# Patient Record
Sex: FEMALE | Race: WHITE | NOT HISPANIC OR LATINO | Employment: UNEMPLOYED | URBAN - METROPOLITAN AREA
[De-identification: names, ages, dates, MRNs, and addresses within clinical notes are randomized per-mention and may not be internally consistent; named-entity substitution may affect disease eponyms.]

---

## 2018-06-24 ENCOUNTER — APPOINTMENT (EMERGENCY)
Dept: RADIOLOGY | Facility: HOSPITAL | Age: 28
End: 2018-06-24
Payer: MEDICAID

## 2018-06-24 ENCOUNTER — APPOINTMENT (EMERGENCY)
Dept: CT IMAGING | Facility: HOSPITAL | Age: 28
End: 2018-06-24
Payer: MEDICAID

## 2018-06-24 ENCOUNTER — APPOINTMENT (EMERGENCY)
Dept: ULTRASOUND IMAGING | Facility: HOSPITAL | Age: 28
End: 2018-06-24
Payer: MEDICAID

## 2018-06-24 ENCOUNTER — HOSPITAL ENCOUNTER (EMERGENCY)
Facility: HOSPITAL | Age: 28
Discharge: LEFT BEFORE DISCHARGE | End: 2018-06-24
Attending: EMERGENCY MEDICINE
Payer: MEDICAID

## 2018-06-24 VITALS
SYSTOLIC BLOOD PRESSURE: 107 MMHG | WEIGHT: 162.92 LBS | HEART RATE: 94 BPM | OXYGEN SATURATION: 94 % | RESPIRATION RATE: 20 BRPM | DIASTOLIC BLOOD PRESSURE: 53 MMHG | TEMPERATURE: 98.4 F

## 2018-06-24 DIAGNOSIS — R65.20 SEVERE SEPSIS (HCC): ICD-10-CM

## 2018-06-24 DIAGNOSIS — J18.9 PNEUMONIA: Primary | ICD-10-CM

## 2018-06-24 DIAGNOSIS — R17 JAUNDICE: ICD-10-CM

## 2018-06-24 DIAGNOSIS — E80.6 DIRECT HYPERBILIRUBINEMIA: ICD-10-CM

## 2018-06-24 DIAGNOSIS — A41.9 SEVERE SEPSIS (HCC): ICD-10-CM

## 2018-06-24 DIAGNOSIS — R10.9 ABDOMINAL PAIN: ICD-10-CM

## 2018-06-24 LAB
ALBUMIN SERPL BCP-MCNC: 2.4 G/DL (ref 3.5–5)
ALP SERPL-CCNC: 86 U/L (ref 46–116)
ALT SERPL W P-5'-P-CCNC: 16 U/L (ref 12–78)
ANION GAP SERPL CALCULATED.3IONS-SCNC: 10 MMOL/L (ref 4–13)
APTT PPP: 55 SECONDS (ref 24–36)
AST SERPL W P-5'-P-CCNC: 86 U/L (ref 5–45)
ATRIAL RATE: 95 BPM
B-HCG SERPL-ACNC: <2 MIU/ML
BASOPHILS # BLD AUTO: 0.04 THOUSANDS/ΜL (ref 0–0.1)
BASOPHILS NFR BLD AUTO: 0 % (ref 0–1)
BILIRUB DIRECT SERPL-MCNC: 15.37 MG/DL (ref 0–0.2)
BILIRUB SERPL-MCNC: 20.4 MG/DL (ref 0.2–1)
BUN SERPL-MCNC: 5 MG/DL (ref 5–25)
CALCIUM SERPL-MCNC: 8 MG/DL (ref 8.3–10.1)
CHLORIDE SERPL-SCNC: 97 MMOL/L (ref 100–108)
CO2 SERPL-SCNC: 26 MMOL/L (ref 21–32)
CREAT SERPL-MCNC: 0.58 MG/DL (ref 0.6–1.3)
EOSINOPHIL # BLD AUTO: 0.19 THOUSAND/ΜL (ref 0–0.61)
EOSINOPHIL NFR BLD AUTO: 1 % (ref 0–6)
ERYTHROCYTE [DISTWIDTH] IN BLOOD BY AUTOMATED COUNT: 16.8 % (ref 11.6–15.1)
GFR SERPL CREATININE-BSD FRML MDRD: 126 ML/MIN/1.73SQ M
GGT SERPL-CCNC: 164 U/L (ref 5–85)
GLUCOSE SERPL-MCNC: 92 MG/DL (ref 65–140)
HAV IGM SER QL: ABNORMAL
HBV CORE IGM SER QL: ABNORMAL
HBV SURFACE AG SER QL: ABNORMAL
HCT VFR BLD AUTO: 28.9 % (ref 34.8–46.1)
HCV AB SER QL: ABNORMAL
HGB BLD-MCNC: 9.8 G/DL (ref 11.5–15.4)
INR PPP: 2.1 (ref 0.86–1.17)
LACTATE SERPL-SCNC: 1.4 MMOL/L (ref 0.5–2)
LIPASE SERPL-CCNC: 74 U/L (ref 73–393)
LYMPHOCYTES # BLD AUTO: 1.26 THOUSANDS/ΜL (ref 0.6–4.47)
LYMPHOCYTES NFR BLD AUTO: 7 % (ref 14–44)
MCH RBC QN AUTO: 34.5 PG (ref 26.8–34.3)
MCHC RBC AUTO-ENTMCNC: 33.9 G/DL (ref 31.4–37.4)
MCV RBC AUTO: 102 FL (ref 82–98)
MONOCYTES # BLD AUTO: 1.87 THOUSAND/ΜL (ref 0.17–1.22)
MONOCYTES NFR BLD AUTO: 10 % (ref 4–12)
NEUTROPHILS # BLD AUTO: 16.01 THOUSANDS/ΜL (ref 1.85–7.62)
NEUTS SEG NFR BLD AUTO: 83 % (ref 43–75)
NT-PROBNP SERPL-MCNC: 1202 PG/ML
P AXIS: 44 DEGREES
PLATELET # BLD AUTO: 318 THOUSANDS/UL (ref 149–390)
PMV BLD AUTO: 10.1 FL (ref 8.9–12.7)
POTASSIUM SERPL-SCNC: 3.6 MMOL/L (ref 3.5–5.3)
PR INTERVAL: 152 MS
PROT SERPL-MCNC: 6.6 G/DL (ref 6.4–8.2)
PROTHROMBIN TIME: 22.9 SECONDS (ref 11.8–14.2)
QRS AXIS: 56 DEGREES
QRSD INTERVAL: 82 MS
QT INTERVAL: 320 MS
QTC INTERVAL: 402 MS
RBC # BLD AUTO: 2.84 MILLION/UL (ref 3.81–5.12)
SODIUM SERPL-SCNC: 133 MMOL/L (ref 136–145)
T WAVE AXIS: 6 DEGREES
VENTRICULAR RATE: 95 BPM
WBC # BLD AUTO: 19.37 THOUSAND/UL (ref 4.31–10.16)

## 2018-06-24 PROCEDURE — 83880 ASSAY OF NATRIURETIC PEPTIDE: CPT | Performed by: EMERGENCY MEDICINE

## 2018-06-24 PROCEDURE — 71046 X-RAY EXAM CHEST 2 VIEWS: CPT

## 2018-06-24 PROCEDURE — 84702 CHORIONIC GONADOTROPIN TEST: CPT | Performed by: EMERGENCY MEDICINE

## 2018-06-24 PROCEDURE — 82977 ASSAY OF GGT: CPT | Performed by: EMERGENCY MEDICINE

## 2018-06-24 PROCEDURE — 96375 TX/PRO/DX INJ NEW DRUG ADDON: CPT

## 2018-06-24 PROCEDURE — 83605 ASSAY OF LACTIC ACID: CPT | Performed by: EMERGENCY MEDICINE

## 2018-06-24 PROCEDURE — 93010 ELECTROCARDIOGRAM REPORT: CPT | Performed by: INTERNAL MEDICINE

## 2018-06-24 PROCEDURE — 85610 PROTHROMBIN TIME: CPT | Performed by: EMERGENCY MEDICINE

## 2018-06-24 PROCEDURE — 36415 COLL VENOUS BLD VENIPUNCTURE: CPT | Performed by: EMERGENCY MEDICINE

## 2018-06-24 PROCEDURE — 82248 BILIRUBIN DIRECT: CPT | Performed by: EMERGENCY MEDICINE

## 2018-06-24 PROCEDURE — 83690 ASSAY OF LIPASE: CPT | Performed by: EMERGENCY MEDICINE

## 2018-06-24 PROCEDURE — 99285 EMERGENCY DEPT VISIT HI MDM: CPT

## 2018-06-24 PROCEDURE — 80074 ACUTE HEPATITIS PANEL: CPT | Performed by: EMERGENCY MEDICINE

## 2018-06-24 PROCEDURE — 85730 THROMBOPLASTIN TIME PARTIAL: CPT | Performed by: EMERGENCY MEDICINE

## 2018-06-24 PROCEDURE — 74177 CT ABD & PELVIS W/CONTRAST: CPT

## 2018-06-24 PROCEDURE — 85025 COMPLETE CBC W/AUTO DIFF WBC: CPT | Performed by: EMERGENCY MEDICINE

## 2018-06-24 PROCEDURE — 96367 TX/PROPH/DG ADDL SEQ IV INF: CPT

## 2018-06-24 PROCEDURE — 96361 HYDRATE IV INFUSION ADD-ON: CPT

## 2018-06-24 PROCEDURE — 93005 ELECTROCARDIOGRAM TRACING: CPT

## 2018-06-24 PROCEDURE — 87040 BLOOD CULTURE FOR BACTERIA: CPT | Performed by: EMERGENCY MEDICINE

## 2018-06-24 PROCEDURE — 76705 ECHO EXAM OF ABDOMEN: CPT

## 2018-06-24 PROCEDURE — 96365 THER/PROPH/DIAG IV INF INIT: CPT

## 2018-06-24 PROCEDURE — 80053 COMPREHEN METABOLIC PANEL: CPT | Performed by: EMERGENCY MEDICINE

## 2018-06-24 RX ORDER — CIPROFLOXACIN 500 MG/1
500 TABLET, FILM COATED ORAL EVERY 12 HOURS SCHEDULED
COMMUNITY

## 2018-06-24 RX ORDER — FUROSEMIDE 20 MG/1
20 TABLET ORAL DAILY PRN
COMMUNITY

## 2018-06-24 RX ORDER — SODIUM CHLORIDE 9 MG/ML
125 INJECTION, SOLUTION INTRAVENOUS CONTINUOUS
Status: DISCONTINUED | OUTPATIENT
Start: 2018-06-24 | End: 2018-06-24 | Stop reason: HOSPADM

## 2018-06-24 RX ORDER — ONDANSETRON 2 MG/ML
4 INJECTION INTRAMUSCULAR; INTRAVENOUS ONCE
Status: COMPLETED | OUTPATIENT
Start: 2018-06-24 | End: 2018-06-24

## 2018-06-24 RX ORDER — PANTOPRAZOLE SODIUM 40 MG/1
40 TABLET, DELAYED RELEASE ORAL DAILY
COMMUNITY

## 2018-06-24 RX ORDER — ALBUMIN, HUMAN INJ 5% 5 %
12.5 SOLUTION INTRAVENOUS ONCE
Status: COMPLETED | OUTPATIENT
Start: 2018-06-24 | End: 2018-06-24

## 2018-06-24 RX ORDER — ONDANSETRON 4 MG/1
4 TABLET, ORALLY DISINTEGRATING ORAL EVERY 6 HOURS PRN
COMMUNITY

## 2018-06-24 RX ORDER — VANCOMYCIN HYDROCHLORIDE 1 G/200ML
15 INJECTION, SOLUTION INTRAVENOUS ONCE
Status: DISCONTINUED | OUTPATIENT
Start: 2018-06-24 | End: 2018-06-24 | Stop reason: HOSPADM

## 2018-06-24 RX ADMIN — PIPERACILLIN SODIUM,TAZOBACTAM SODIUM 3.38 G: 3; .375 INJECTION, POWDER, FOR SOLUTION INTRAVENOUS at 08:11

## 2018-06-24 RX ADMIN — ALBUMIN HUMAN 12.5 G: 0.05 INJECTION, SOLUTION INTRAVENOUS at 07:57

## 2018-06-24 RX ADMIN — HYDROMORPHONE HYDROCHLORIDE 0.5 MG: 1 INJECTION, SOLUTION INTRAMUSCULAR; INTRAVENOUS; SUBCUTANEOUS at 06:40

## 2018-06-24 RX ADMIN — SODIUM CHLORIDE 125 ML/HR: 0.9 INJECTION, SOLUTION INTRAVENOUS at 07:19

## 2018-06-24 RX ADMIN — ONDANSETRON 4 MG: 2 INJECTION INTRAMUSCULAR; INTRAVENOUS at 06:43

## 2018-06-24 RX ADMIN — IOHEXOL 100 ML: 350 INJECTION, SOLUTION INTRAVENOUS at 08:18

## 2018-06-24 RX ADMIN — SODIUM CHLORIDE 500 ML: 0.9 INJECTION, SOLUTION INTRAVENOUS at 06:40

## 2018-06-24 NOTE — ED CARE HANDOFF
Emergency Department Sign Out Note        Sign out and transfer of care from Dr Travis Berger  See Separate Emergency Department note  The patient, Delaine Bosworth, was evaluated by the previous provider for jaundice, abdominal pain  ED Course / Workup Pending (followup): Patient decided to leave against medical advice, patient and mother both signed against medical advice form the understands this is 100% against my advice I did offer multiple times to go through our transfer center to try to arrange ambulance transport, they are highly concerned about a bill and they feel that she is stable enough to be driven directly up to [place]  , they understand my reservations mother states she is been a nurse for 25 years and knows that her daughter is ill, but she feels that she can take her up there  The patient her/her daughter also agrees that she wants to be discharged at this time patient given all copies of imaging studies being done here, records can be seen in epic at her receiving facility                       ED Course as of Jun 24 0929   Sun Jun 24, 2018   0911 Called into patient's room, patient and mother stating that they want to leave against medical advice and drive up to [facility]  , explained to them that I can call [place] and try to arrange for ambulance transfer  , they state that they have done this before and they are highly concerned about a bill from the insurance company that they will be unable to pay, I explained that I do believe her to be critically sick, I absolutely recommend being further resuscitated here  Patient and mother both understand, agree that she is sick need more help, they state multiple times a regrets bring her down to [place] as they understand that she was way too sick for this visit  They are requesting to sign out against medical advice and drive directly to [place]        Procedures  MDM  CritCare Time      Disposition  Final diagnoses: Pneumonia   Jaundice   Abdominal pain   Direct hyperbilirubinemia   Severe sepsis ([facility] [address] )     Time reflects when diagnosis was documented in both MDM as applicable and the Disposition within this note     Time User Action Codes Description Comment    6/24/2018  8:18 AM Nancye Tommy Add [J18 9] Pneumonia     6/24/2018  8:18 AM Nancye Tommy Add [R17] Jaundice     6/24/2018  8:18 AM Nancye Tommy Add [R10 9] Abdominal pain     6/24/2018  8:19 AM Nancye Tommy Add [E80 6] Direct hyperbilirubinemia     6/24/2018  8:19 AM Nancye Tommy Add [A41 9,  R65 20] Severe sepsis [facility])       ED Disposition     ED Disposition Condition Comment    AMA  Date: 6/24/2018  Patient: Alannah Mays  Admitted: 6/24/2018  6:03 AM  Attending Provider: Katie Linn DO    Alannah Mays or her authorized caregiver has made the decision for the patient to leave the emergency department against the advice of he r attending physician  She or her authorized caregiver has been informed and understands the inherent risks, including death, permanent organ injury, permanent liver injury, liver failure, respiratory distress/rest   She or her authorized caregiver has  decided to accept the responsibility for this decision  Alannah Mays and all necessary parties have been advised that she may return for further evaluation or treatment  Her condition at time of discharge was critical   Alannah Mays had current vital  signs as follows:  /52 (BP Location: Right arm)   Pulse 100   Temp 98 4 °F (36 9 °C) (Oral)   Resp 22   Wt 73 9 kg (162 lb 14 7 oz)   LMP 04/24/2018 (Approximate)         Follow-up Information    None       Patient's Medications   Discharge Prescriptions    No medications on file     No discharge procedures on file         ED Provider  Electronically Signed by     Katie Linn DO  06/24/18 2850

## 2018-06-24 NOTE — ED PROVIDER NOTES
History  Chief Complaint   Patient presents with    Abdominal Pain     Pt presents to ED for evaluation and treatment of abdominaL pain and cough  Pt under care of transplant team at [facility] for liver issues  Hx of Hep C -      Patient is a 28year old female with worsening abdominal pain with nausea and diarrhea since 0300 this AM  Took hydrocodone without relief  Has a h/o hepatitis C which was cured with medication  Has had intermittent episodes of jaundice as well  Has had prior pancreatitis in the past as well  Has been at  [facility] and they did not want to remove her gallbladder there due to her liver issues and was referred to Liver Transplant team at [facility]  Was admitted in [facility] on 6/11/18 for possible cholecystitis and they determined patient did not need surgery although US and CT showed gallstones with inflammation of gallbladder wall and positive Michael's sign on US there  Patient was given IV abx and discharged on cipro and is currently taking this  Patient had EGD there but no ERCP  Patient is visiting sister for wedding here  Has had cough for 2 weeks  No fever  No urinary sx except dark urine  Has had prior Valeria-Gómez tear from vomiting in the past  Has had prior mononucleosis as well  Has had liver bx in the past  No other abdominal surgery  No recent old records from this ED seen on computer system  [employer] [facility] website checked on this patient and no Rx found  No sob   (+) chronic leg swelling  Has needed albumin infusion in the past  Patient needed my urgent attention  History provided by:  Patient and parent   used: No        Prior to Admission Medications   Prescriptions Last Dose Informant Patient Reported?  Taking?   ciprofloxacin (CIPRO) 500 mg tablet   Yes Yes   Sig: Take 500 mg by mouth every 12 (twelve) hours   furosemide (LASIX) 20 mg tablet   Yes Yes   Sig: Take 20 mg by mouth daily as needed (Leg swelling)   ondansetron (ZOFRAN-ODT) 4 mg disintegrating tablet   Yes Yes   Sig: Take 4 mg by mouth every 6 (six) hours as needed for nausea or vomiting   pantoprazole (PROTONIX) 40 mg tablet   Yes Yes   Sig: Take 40 mg by mouth daily      Facility-Administered Medications: None       Past Medical History:   Diagnosis Date    Esophageal tear     Hepatitis C virus infection resolved after antiviral drug therapy     Mononucleosis     Pancreatitis        Past Surgical History:   Procedure Laterality Date    LIVER BIOPSY      transjugular    PERCUTANEOUS LIVER BIOPSY         No family history on file  I have reviewed and agree with the history as documented  Social History   Substance Use Topics    Smoking status: Former Smoker    Smokeless tobacco: Not on file    Alcohol use No        Review of Systems   Constitutional: Negative for fever  Respiratory: Positive for cough  Negative for shortness of breath  Cardiovascular: Positive for leg swelling  Gastrointestinal: Positive for abdominal pain, diarrhea and nausea  Negative for vomiting  Skin: Positive for color change (jaundice)  All other systems reviewed and are negative  Physical Exam  Physical Exam   Constitutional: She is oriented to person, place, and time  She appears distressed (moderate)  HENT:   Head: Normocephalic and atraumatic  Mucous membranes somewhat dry  Eyes: Scleral icterus is present  Neck: No tracheal deviation present  Cardiovascular: Normal rate, regular rhythm and normal heart sounds  No murmur heard  Pulmonary/Chest: Breath sounds normal  No stridor  No respiratory distress  She has no wheezes  She has no rales  Tachypnea  Abdominal: Soft  Bowel sounds are normal  She exhibits distension  There is tenderness (diffuse but worse in RUQ)  There is no rebound and no guarding  Musculoskeletal: She exhibits edema (bilateral LE edema)  She exhibits no tenderness (No calf tenderness) or deformity     Neurological: She is alert and oriented to person, place, and time  Skin: Skin is warm and dry  No rash noted  Jaundiced  Psychiatric:   Anxious  Nursing note and vitals reviewed  Vital Signs  ED Triage Vitals [06/24/18 0617]   Temperature Pulse Respirations Blood Pressure SpO2   98 4 °F (36 9 °C) 98 (!) 24 118/61 98 %      Temp Source Heart Rate Source Patient Position - Orthostatic VS BP Location FiO2 (%)   Oral Monitor Lying Right arm --      Pain Score       Worst Possible Pain           Vitals:    06/24/18 0617 06/24/18 0722 06/24/18 0800   BP: 118/61 110/56 107/52   Pulse: 98 98 100   Patient Position - Orthostatic VS: Lying Sitting Lying       Visual Acuity      ED Medications  Medications   sodium chloride 0 9 % infusion (125 mL/hr Intravenous New Bag 6/24/18 0719)   piperacillin-tazobactam (ZOSYN) 3 375 g in sodium chloride 0 9 % 50 mL IVPB (3 375 g Intravenous New Bag 6/24/18 0811)   vancomycin (VANCOCIN) IVPB (premix) 1,000 mg (not administered)   HYDROmorphone (DILAUDID) injection 0 5 mg (0 5 mg Intravenous Given 6/24/18 0640)   sodium chloride 0 9 % bolus 500 mL (0 mL Intravenous Stopped 6/24/18 0718)   ondansetron (ZOFRAN) injection 4 mg (4 mg Intravenous Given 6/24/18 0643)   albumin human (FLEXBUMIN) 5 % injection 12 5 g (12 5 g Intravenous New Bag 6/24/18 0757)   iohexol (OMNIPAQUE) 350 MG/ML injection (MULTI-DOSE) 100 mL (100 mL Intravenous Given 6/24/18 0818)       Diagnostic Studies  Results Reviewed     Procedure Component Value Units Date/Time    Blood culture #1 [[id number]] Collected:  06/24/18 0753    Lab Status: In process Specimen:  Blood from Arm, Left Updated:  06/24/18 0810    Lactic acid, plasma [[id number]]  (Normal) Collected:  06/24/18 4568    Lab Status:  Final result Specimen:  Blood from Arm, Right Updated:  06/24/18 0736     LACTIC ACID 1 4 mmol/L     Narrative:         Result may be elevated if tourniquet was used during collection      Comprehensive metabolic panel [[id number]]  (Abnormal) Collected:  06/24/18 7120    Lab Status:  Final result Specimen:  Blood from Arm, Right Updated:  06/24/18 0735     Sodium 133 (L) mmol/L      Potassium 3 6 mmol/L      Chloride 97 (L) mmol/L      CO2 26 mmol/L      Anion Gap 10 mmol/L      BUN 5 mg/dL      Creatinine 0 58 (L) mg/dL      Glucose 92 mg/dL      Calcium 8 0 (L) mg/dL      AST 86 (H) U/L      ALT 16 U/L      Alkaline Phosphatase 86 U/L      Total Protein 6 6 g/dL      Albumin 2 4 (L) g/dL      Total Bilirubin 20 40 (H) mg/dL      eGFR 126 ml/min/1 73sq m     Narrative:         National Kidney Disease Education Program recommendations are as follows:  GFR calculation is accurate only with a steady state creatinine  Chronic Kidney disease less than 60 ml/min/1 73 sq  meters  Kidney failure less than 15 ml/min/1 73 sq  meters  Quantitative hCG [[id number]]  (Normal) Collected:  06/24/18 3507    Lab Status:  Final result Specimen:  Blood from Arm, Right Updated:  06/24/18 0735     HCG, Quant <2 mIU/mL     Narrative:          Expected Ranges:     Approximate               Approximate HCG  Gestation age          Concentration ( mIU/mL)  _____________          ______________________   [facility]                      HCG values  0 2-1                       5-50  1-2                           2-3                         100-5000  3-4                         500-10474  4-5                         1000-84059  5-6                         [id number]  6-8                         75664-205015  8-12                        70815-377465    Lipase [[id number]]  (Normal) Collected:  06/24/18 0632    Lab Status:  Final result Specimen:  Blood from Arm, Right Updated:  06/24/18 0728     Lipase 74 u/L     Blood culture #2 [[id number]] Collected:  06/24/18 4264    Lab Status:   In process Specimen:  Blood from Arm, Right Updated:  06/24/18 0709    Bilirubin, direct [[id number]]  (Abnormal) Collected:  06/24/18 0634    Lab Status:  Final result Specimen:  Blood from Arm, Right Updated:  06/24/18 0705     Bilirubin, Direct 15 37 (H) mg/dL     Protime-INR [[id number]]  (Abnormal) Collected:  06/24/18 0632    Lab Status:  Final result Specimen:  Blood from Arm, Right Updated:  06/24/18 0704     Protime 22 9 (H) seconds      INR 2 10 (H)    APTT [[id number]]  (Abnormal) Collected:  06/24/18 1619    Lab Status:  Final result Specimen:  Blood from Arm, Right Updated:  06/24/18 0704     PTT 55 (H) seconds     CBC and differential [[id number]]  (Abnormal) Collected:  06/24/18 7388    Lab Status:  Final result Specimen:  Blood from Arm, Right Updated:  06/24/18 0655     WBC 19 37 (H) Thousand/uL      RBC 2 84 (L) Million/uL      Hemoglobin 9 8 (L) g/dL      Hematocrit 28 9 (L) %       (H) fL      MCH 34 5 (H) pg      MCHC 33 9 g/dL      RDW 16 8 (H) %      MPV 10 1 fL      Platelets 332 Thousands/uL      Neutrophils Relative 83 (H) %      Lymphocytes Relative 7 (L) %      Monocytes Relative 10 %      Eosinophils Relative 1 %      Basophils Relative 0 %      Neutrophils Absolute 16 01 (H) Thousands/µL      Lymphocytes Absolute 1 26 Thousands/µL      Monocytes Absolute 1 87 (H) Thousand/µL      Eosinophils Absolute 0 19 Thousand/µL      Basophils Absolute 0 04 Thousands/µL     Gamma GT [[id number]] Collected:  06/24/18 0634    Lab Status: In process Specimen:  Blood from Arm, Right Updated:  06/24/18 0642    Hepatitis panel, acute [[id number]] Collected:  06/24/18 0634    Lab Status: In process Specimen:  Blood from Arm, Right Updated:  06/24/18 0642                 XR chest 2 views   ED Interpretation by Quin Schaumann, MD (06/24 5976)   RUL and RLL infiltrates read by me         CT abdomen pelvis with contrast    (Results Pending)   US gallbladder    (Results Pending)              Procedures  ECG 12 Lead Documentation  Date/Time: 6/24/2018 6:51 AM  Performed by: Cordell Hernandez  Authorized by: Cordell Hernandez     Indications / Diagnosis:  Abdominal pain  ECG reviewed by me, the ED Provider: yes    Patient location:  ED  Previous ECG:     Previous ECG:  Unavailable  Rate:     ECG rate:  95    ECG rate assessment: normal    Rhythm:     Rhythm: sinus rhythm    Ectopy:     Ectopy: none    QRS:     QRS axis:  Normal  Conduction:     Conduction: normal    ST segments:     ST segments:  Normal  T waves:     T waves: normal    Other findings:     Other findings: poor R wave progression    Comments:      Possible LAE  I do not agree with computer reading of cannot rule out anterior infarct, age undetermined  Phone Contacts  ED Phone Contact    ED Course  ED Course as of Jun 24 0830   Ramon Odellen Jun 24, 2018   0701 Last admission at [facility] reviewed by me in [facility] from 6/11/18  Labs results were: "LAB DATA:    Recent Labs  Lab 06/15/18  0134 06/14/18  0153 06/13/18  0120 06/12/18  0305 06/11/18  1753   WBC 11 0* 11 7* 14 1* 11 1* 13 7*   Hematocrit 28* 30* 30* 29* 34   * 103* 104* 104* 104*   Platelets 890 775 485 233 292     Recent Labs  Lab 06/15/18  0134 06/14/18  0153 06/13/18  0120 06/12/18  0305 06/11/18  1753   Sodium 137 139 135 138 --   Potassium 3 7 3 6 3 9 3 6 --   Chloride 102 104 100 102 --   CO2 21 23 22 24 --   UN <4* <4* <4* <4* --   Creatinine 0 38* 0 37* 0 45* 0 39* 0 32*     Recent Labs  Lab 06/15/18  0134 06/14/18  0153 06/13/18  0120 06/12/18  0305 06/11/18  1753   Alk Phos 76 77 79 80 93   Bilirubin,Total 9 5* 9 0* 10 9* 9 4* 9 3*   Bilirubin,Direct -- -- -- -- 7 2*   Albumin 3 3* 3 0* 2 9* 2 4* 3 0*   ALT 14 19 13 14 18   AST 74* 70* 67* 55* 62*     No components found with this basename: PROT     Recent Labs  Lab 06/11/18  1753   Amylase 5*   Lipase 11*     Recent Labs  Lab 06/15/18  0134   INR 1 8*    "    0734 Labs and CXR d/w patient  Pain improved  IV abx ordered  6581 Rest of labs d/w patient   I explained to patient that transfer to [facility] from here is not really feasable and that patient may need to transferred to a closed liver transplant center ([facility] or [facility]"" in [place]) if patient cannot be admitted to [facility] [place]  5710 Signed out to Dr Audelia Card to check CT, US and urine and then arrange dispositioning  3673 Sepsis alert called by me      4110 D/w critical care AP who will see patient in ED this AM                            Initial Sepsis Screening     Row Name 06/24/18 0814                Is the patient's history suggestive of a new or worsening infection? (!)  Yes (Proceed)  -AO        Suspected source of infection pneumonia;acute abdominal infection  -AO        Are two or more of the following signs & symptoms of infection both present and new to the patient? (!)  Yes (Proceed)  -AO        Indicate SIRS criteria Tachypnea > 20 resp per min; Tachycardia > 90 bpm;Leukocytosis (WBC > 80713 IJL)  -AO        If the answer is yes to both questions, suspicion of sepsis is present          If severe sepsis is present AND tissue hypoperfusion perists in the hour after fluid resuscitation or lactate > 4, the patient meets criteria for SEPTIC SHOCK          Are any of the following organ dysfunction criteria present within 6 hours of suspected infection and SIRS criteria that are NOT considered to be chronic conditions? (!)  Yes  -AO        Organ dysfunction Bilirubin > 2 0 mg/dL;INR > 1 5 or aPTT > 60 secs  -AO        Date of presentation of severe sepsis 06/24/18  -AO        Time of presentation of severe sepsis 0815  -AO        Tissue hypoperfusion persists in the hour after crystalloid fluid administration, evidenced, by either:          Was hypotension present within one hour of the conclusion of crystalloid fluid administration?           Date of presentation of septic shock          Time of presentation of septic shock            User Key  (r) = Recorded By, (t) = Taken By, (c) = Cosigned By    [address] Name Provider Amirah Berry MD Physician                  MDM  Number of Diagnoses or Management Options  Diagnosis management comments: DDX including but not limited to: jaundice, hepatitis, obstructive hepatitis, choledocholithiasis, cholecystitis, pancreatitis, cholangitis, cirrhosis, cholestasis, drug toxicity, hemolytic anemia, Gilbert syndrome, infectious etiology, tumor  DDx including but not limited to: appendicitis, gastroenteritis, gastritis, PUD, GERD, gastroparesis, colitis, enteritis, food poisoning, mesenteric adenitis, IBD, IBS, ileus, bowel obstruction, volvulus, perforated viscus, splenic etiology, diverticulitis, internal hernia, constipation, pelvic pathology, renal colic, pyelonephritis, UTI  Amount and/or Complexity of Data Reviewed  Clinical lab tests: ordered and reviewed  Tests in the radiology section of CPT®: ordered and reviewed  Decide to obtain previous medical records or to obtain history from someone other than the patient: yes  Obtain history from someone other than the patient: yes  Independent visualization of images, tracings, or specimens: yes      The patient presented with a condition in which there was a high probability of imminent or life-threatening deterioration, and critical care services (excluding separately billable procedures) totalled 30-74 minutes          Disposition  Final diagnoses:   Pneumonia   Jaundice   Abdominal pain   Direct hyperbilirubinemia   Severe sepsis ([facility] [address] )     Time reflects when diagnosis was documented in both MDM as applicable and the Disposition within this note     Time User Action Codes Description Comment    6/24/2018  8:18 AM Yarely Leer Add [J18 9] Pneumonia     6/24/2018  8:18 AM Yarely Leer Add [R17] Jaundice     6/24/2018  8:18 AM Yarely Leer Add [R10 9] Abdominal pain     6/24/2018  8:19 AM Yarely Leer Add [E80 6] Direct hyperbilirubinemia     6/24/2018  8:19 AM Yarely Leer Add [A41 9,  R65 20] Severe sepsis [facility])       ED Disposition     None      Follow-up Information    None Patient's Medications   Discharge Prescriptions    No medications on file     No discharge procedures on file      ED Provider  Electronically Signed by           Brandin Perez MD  06/24/18 1800

## 2018-06-29 LAB
BACTERIA BLD CULT: NORMAL
BACTERIA BLD CULT: NORMAL

## 2019-01-16 ENCOUNTER — TELEPHONE (OUTPATIENT)
Dept: FAMILY MEDICINE CLINIC | Facility: CLINIC | Age: 29
End: 2019-01-16

## 2019-01-16 NOTE — TELEPHONE ENCOUNTER
T/c from patient yesterday requesting immunization records to begin school  Need to obtain paper chart from [facility]  Mom Virginia called to f/u today  Chart received  Unable to reach patient by phone  T/c to Mom ( communication form on file with permission to speak to Mom) Mom gave me Gloria's email and Immunizations emailed to patient today @ [email or web address]  [email or web address]  Immunizations to be scanned